# Patient Record
Sex: FEMALE | Race: WHITE | Employment: PART TIME
[De-identification: names, ages, dates, MRNs, and addresses within clinical notes are randomized per-mention and may not be internally consistent; named-entity substitution may affect disease eponyms.]

---

## 2017-07-29 ENCOUNTER — HEALTH MAINTENANCE LETTER (OUTPATIENT)
Age: 37
End: 2017-07-29

## 2017-10-01 ENCOUNTER — HEALTH MAINTENANCE LETTER (OUTPATIENT)
Age: 37
End: 2017-10-01

## 2019-08-31 ENCOUNTER — HOSPITAL ENCOUNTER (EMERGENCY)
Facility: CLINIC | Age: 39
Discharge: HOME | End: 2019-08-31
Attending: EMERGENCY MEDICINE | Admitting: EMERGENCY MEDICINE
Payer: COMMERCIAL

## 2019-08-31 VITALS
WEIGHT: 194 LBS | SYSTOLIC BLOOD PRESSURE: 144 MMHG | DIASTOLIC BLOOD PRESSURE: 90 MMHG | RESPIRATION RATE: 18 BRPM | BODY MASS INDEX: 28.65 KG/M2 | OXYGEN SATURATION: 100 % | TEMPERATURE: 97.9 F

## 2019-08-31 DIAGNOSIS — G89.29 CHRONIC NECK AND BACK PAIN: ICD-10-CM

## 2019-08-31 DIAGNOSIS — M54.9 CHRONIC NECK AND BACK PAIN: ICD-10-CM

## 2019-08-31 DIAGNOSIS — M54.2 CHRONIC NECK AND BACK PAIN: ICD-10-CM

## 2019-08-31 PROCEDURE — 99284 EMERGENCY DEPT VISIT MOD MDM: CPT | Mod: Z6 | Performed by: EMERGENCY MEDICINE

## 2019-08-31 PROCEDURE — 99282 EMERGENCY DEPT VISIT SF MDM: CPT | Performed by: EMERGENCY MEDICINE

## 2019-08-31 RX ORDER — OXYCODONE AND ACETAMINOPHEN 5; 325 MG/1; MG/1
1 TABLET ORAL EVERY 6 HOURS PRN
Qty: 7 TABLET | Refills: 0 | Status: SHIPPED | OUTPATIENT
Start: 2019-08-31

## 2019-08-31 RX ORDER — METHYLPREDNISOLONE 4 MG
TABLET, DOSE PACK ORAL
Qty: 21 TABLET | Refills: 0 | Status: SHIPPED | OUTPATIENT
Start: 2019-08-31 | End: 2020-04-23

## 2019-08-31 ASSESSMENT — ENCOUNTER SYMPTOMS
CARDIOVASCULAR NEGATIVE: 1
HEMATOLOGIC/LYMPHATIC NEGATIVE: 1
EYES NEGATIVE: 1
NUMBNESS: 1
NECK PAIN: 1
BACK PAIN: 1
PSYCHIATRIC NEGATIVE: 1
CONSTITUTIONAL NEGATIVE: 1
ENDOCRINE NEGATIVE: 1
GASTROINTESTINAL NEGATIVE: 1
ALLERGIC/IMMUNOLOGIC NEGATIVE: 1
RESPIRATORY NEGATIVE: 1

## 2019-08-31 NOTE — ED NOTES
Pt has chronic neck pain with impingement. Getting steroid injection on 9/5 but having numbness down right side of spine x 2 weeks and increased pain. Cms intact

## 2019-08-31 NOTE — DISCHARGE INSTRUCTIONS
1) we have agreed to allow you to go home with plan to try oral steroids and pain medication provided as a short-term refill of your Percocet as requested.    2) we discussed comparison MRI imaging with the last imaging from December 2018.  You have elected to go home to pursue MRI as an outpatient through your primary care provider and your primary orthopedic spine specialist.    3) if your symptoms change or worsen including but not limited to persistent weakness with numbness, fever, chills, pain not improved with a plan of care with oral steroids and pain medication in addition to taking Advil you should return to the emergency department for reevaluation and additional care

## 2019-08-31 NOTE — ED AVS SNAPSHOT
[facility] Emergency Department  [address] [address]  Phone:  [phone]  Fax:  [phone]                                    Grecia Watkins   MRN: [id number]    Department:  [facility] Emergency Department   Date of Visit:  8/31/2019           After Visit Summary Signature Page    I have received my discharge instructions, and my questions have been answered. I have discussed any challenges I see with this plan with the nurse or doctor.    ..........................................................................................................................................  Patient/Patient Representative Signature      ..........................................................................................................................................  Patient Representative Print Name and Relationship to Patient    ..................................................               ................................................  Date                                   Time    ..........................................................................................................................................  Reviewed by Signature/Title    ...................................................              ..............................................  Date                                               Time          22EPIC Rev 08/18
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
## 2019-11-04 ENCOUNTER — HEALTH MAINTENANCE LETTER (OUTPATIENT)
Age: 39
End: 2019-11-04

## 2020-04-23 ENCOUNTER — VIRTUAL VISIT (OUTPATIENT)
Dept: OBGYN | Facility: CLINIC | Age: 40
End: 2020-04-23
Payer: COMMERCIAL

## 2020-04-23 VITALS — WEIGHT: 170 LBS | HEIGHT: 69 IN | BODY MASS INDEX: 25.18 KG/M2

## 2020-04-23 DIAGNOSIS — N93.9 ABNORMAL UTERINE BLEEDING: Primary | ICD-10-CM

## 2020-04-23 PROCEDURE — 99202 OFFICE O/P NEW SF 15 MIN: CPT | Mod: 95 | Performed by: OBSTETRICS & GYNECOLOGY

## 2020-04-23 ASSESSMENT — MIFFLIN-ST. JEOR: SCORE: 1505.49

## 2020-04-23 NOTE — PROGRESS NOTES
"Grecia Watkins is a 39 year old female who is being evaluated via a billable telephone visit.      The patient has been notified of following:     \"This telephone visit will be conducted via a call between you and your physician/provider. We have found that certain health care needs can be provided without the need for a physical exam.  This service lets us provide the care you need with a short phone conversation.  If a prescription is necessary we can send it directly to your pharmacy.  If lab work is needed we can place an order for that and you can then stop by our lab to have the test done at a later time.    Telephone visits are billed at different rates depending on your insurance coverage. During this emergency period, for some insurers they may be billed the same as an in-person visit.  Please reach out to your insurance provider with any questions.    If during the course of the call the physician/provider feels a telephone visit is not appropriate, you will not be charged for this service.\"    Patient has given verbal consent for Telephone visit?  Yes    How would you like to obtain your AVS? Lindsay    Ms. Grecia Watkins 39 year old P1 (hx of ) calls to inquire about switching from the ParaGard IUD to a hormonal IUD option. She has had the Paragard IUD for 2 years and over that 2 year span of time has experienced heavy periods that last 10 days at a time. It has caused her anemia and she has had to get blood tests every 3 months ordered by her PCP to have her Hgb and ferritin levels monitored. She also endorses fatigue and presyncopal spells with her anemia. She reports having ParaGard many years prior and although her bleeding was heavy as well then, it was tolerable and did not cause symptoms of anemia. She has tried other birth control methods in the past particularly estrogen containing birth control methods which have lead to chest pain symptoms. Therefore, she would prefer not to return to that method " "of birth control.   She also inquires about her vitamin D deficiency. She is currently on supplementation for it at (16866 units per day) which has brought back her vitamin D level from deficiency levels to just at the low level of normal (insufficiency levels). Again, her PCP is monitoring her vitamin D levels as well every 3 months.   She states that if her bleeding does not get addressed well with the hormonal IUD options, then she will elect to proceed with an endometrial ablation and would also like to hear about that as well.   Otherwise, no other issues or concerns to address.     Ht 1.753 m (5' 9\")   Wt 77.1 kg (170 lb)   LMP 04/01/2020 (Exact Date)   BMI 25.10 kg/m    General: In no acute distress vocally.     A/P: Abnormal uterine bleeding  -- likely associated with side effects of ParaGard as it is known for making periods heavy  -- at the same time, can not rule out the possibility of hyperplasia/malignancy as a possible cause as well  -- reviewed hormonal IUD options spanning Mirena IUD, Misty IUD and Kyleena IUD along with associated benefits, risks, bleeding precautions and expectations and likelihood for amenorrhea; patient ultimately elects to proceed with Mirena IUD insertion  -- instructed to make an appointment for ParaGard IUD removal, followed by endometrial biopsy to rule out endometrial hyperplasia/malignancy, and then Mirena IUD insertion which in her case is deemed essential in light of the symptomatic anemia she has been dealing with   -- also counseled patient on the endometrial ablation if she ever needs to pursue this in the future with discussion on benefits, risks, success rate, post-ablation implications with regards to recurrence of bleeding and inability/unreliability of results in sampling the uterine to evaluate for endometrial hyperplasia/malignancy  -- patient conveys understanding of all that was discussed above  -- bleeding precautions reviewed    Telephone time: 22 " minutes    Erika Swain MD

## 2020-11-22 ENCOUNTER — HEALTH MAINTENANCE LETTER (OUTPATIENT)
Age: 40
End: 2020-11-22

## 2021-09-18 ENCOUNTER — HEALTH MAINTENANCE LETTER (OUTPATIENT)
Age: 41
End: 2021-09-18

## 2022-01-08 ENCOUNTER — HEALTH MAINTENANCE LETTER (OUTPATIENT)
Age: 42
End: 2022-01-08

## 2022-11-20 ENCOUNTER — HEALTH MAINTENANCE LETTER (OUTPATIENT)
Age: 42
End: 2022-11-20

## 2023-04-15 ENCOUNTER — HEALTH MAINTENANCE LETTER (OUTPATIENT)
Age: 43
End: 2023-04-15

## 2024-02-03 ENCOUNTER — HEALTH MAINTENANCE LETTER (OUTPATIENT)
Age: 44
End: 2024-02-03